# Patient Record
Sex: FEMALE | Race: WHITE | NOT HISPANIC OR LATINO | URBAN - METROPOLITAN AREA
[De-identification: names, ages, dates, MRNs, and addresses within clinical notes are randomized per-mention and may not be internally consistent; named-entity substitution may affect disease eponyms.]

---

## 2020-08-25 ENCOUNTER — EMERGENCY (EMERGENCY)
Facility: HOSPITAL | Age: 20
LOS: 1 days | Discharge: HOME | End: 2020-08-25
Payer: COMMERCIAL

## 2020-08-25 VITALS
HEART RATE: 96 BPM | DIASTOLIC BLOOD PRESSURE: 66 MMHG | TEMPERATURE: 97 F | RESPIRATION RATE: 17 BRPM | OXYGEN SATURATION: 98 % | SYSTOLIC BLOOD PRESSURE: 113 MMHG

## 2020-08-25 DIAGNOSIS — Z20.828 CONTACT WITH AND (SUSPECTED) EXPOSURE TO OTHER VIRAL COMMUNICABLE DISEASES: ICD-10-CM

## 2020-08-25 PROCEDURE — U0003: CPT

## 2020-08-25 PROCEDURE — 99283 EMERGENCY DEPT VISIT LOW MDM: CPT

## 2020-08-25 NOTE — ED STATDOCS - PATIENT PORTAL LINK FT
You can access the FollowMyHealth Patient Portal offered by [facility] by registering at the following website: [email or web address]. By joining [employer]’s FollowMyHealth portal, you will also be able to view your health information using other applications (apps) compatible with our system.

## 2020-08-25 NOTE — ED ADULT TRIAGE NOTE - AS TEMP SITE
Scheduled procedure with Patient at      Telephone Information:   Mobile [phone]      Scheduled Via: Case Request Order for  [facility]  Patient prefers NA facility rather than the doctor's preferred facility  Procedure date: 1.10.2020  Procedure time: 7:15AM  Insurance confirmed as [employer], will be the same at time of procedure?: Yes  Insurance Accepted at Facility? Yes    The following have been confirmed:  Latex Allergy No  Diabetic No  Sleep Apnea No  Diuretic/Water pill No  Defibrillator/Pacemaker No  MRSA hx No  Blood thinners: Coumadin (Warfarin) or Plavix No      Aspirin No      Phentermine (diet pill) No    Prep required? Yes, HIBICLENS Briefly reviewed? Yes  If procedure is scheduled 7 days or less, patient was told to  prep letter?: n/a  Letter sent via [facility]     oral

## 2020-08-26 LAB — SARS-COV-2 RNA SPEC QL NAA+PROBE: SIGNIFICANT CHANGE UP

## 2021-08-31 ENCOUNTER — TRANSCRIPTION ENCOUNTER (OUTPATIENT)
Age: 21
End: 2021-08-31

## 2021-09-01 ENCOUNTER — LABORATORY RESULT (OUTPATIENT)
Age: 21
End: 2021-09-01

## 2021-09-01 ENCOUNTER — APPOINTMENT (OUTPATIENT)
Dept: OBGYN | Facility: CLINIC | Age: 21
End: 2021-09-01
Payer: COMMERCIAL

## 2021-09-01 VITALS
DIASTOLIC BLOOD PRESSURE: 60 MMHG | SYSTOLIC BLOOD PRESSURE: 102 MMHG | BODY MASS INDEX: 22.2 KG/M2 | HEIGHT: 64 IN | WEIGHT: 130 LBS

## 2021-09-01 DIAGNOSIS — Z97.5 PRESENCE OF (INTRAUTERINE) CONTRACEPTIVE DEVICE: ICD-10-CM

## 2021-09-01 DIAGNOSIS — Z11.3 ENCOUNTER FOR SCREENING FOR INFECTIONS WITH A PREDOMINANTLY SEXUAL MODE OF TRANSMISSION: ICD-10-CM

## 2021-09-01 PROCEDURE — 36415 COLL VENOUS BLD VENIPUNCTURE: CPT

## 2021-09-01 PROCEDURE — 99385 PREV VISIT NEW AGE 18-39: CPT

## 2021-09-01 RX ORDER — DESVENLAFAXINE 50 MG/1
50 TABLET, EXTENDED RELEASE ORAL
Refills: 0 | Status: ACTIVE | COMMUNITY

## 2021-09-01 NOTE — PLAN
[FreeTextEntry1] : Health Maintenance: 21 year old female pt presents for establish gyn care\par BSE taught\par Reviewed diet and exercise\par Breast and pelvic exam performed\par Pap Gc/Chl conducted\par STI testing\par \par RTO in 1 year or PRN\par

## 2021-09-01 NOTE — HISTORY OF PRESENT ILLNESS
[FreeTextEntry1] : NANETTE FRANCO 21 year old female G0 LMP 08/05/2021, presents for [facility] gynecologic care, [facility] ParaGuard IUD\par No Hx of STD, PID, or IUD use for contraception. No Hx of abnormal pap smear. No ovarian cyst, uterine fibroid, endometriosis, pelvic infection or infertility.\par \par She feels well and offers no complaints - no more emotional symptoms since starting copper IUD. \par She reports monthly menses on ParaGuard IUD. She denies intermenstrual bleeding, abn vaginal discharge or vaginitis symptoms. No urinary complaints. BM is normal per patient, no bloody stool. She denies abdominal and pelvic pain.\par \par Pt is sexually active. 1 of partners. always condoms. Denies sexual dysfunction.\par \par \par depression - on prestiq, seen by psych and therapist weekly.

## 2021-09-01 NOTE — COUNSELING
[Nutrition/ Exercise/ Weight Management] : nutrition, exercise, weight management [Vitamins/Supplements] : vitamins/supplements [Breast Self Exam] : breast self exam [Contraception/ Emergency Contraception/ Safe Sexual Practices] : contraception, emergency contraception, safe sexual practices [Confidentiality] : confidentiality [STD (testing, results, tx)] : STD (testing, results, tx)

## 2021-09-02 LAB
C TRACH RRNA SPEC QL NAA+PROBE: NOT DETECTED
HBV SURFACE AG SER QL: NONREACTIVE
HCV AB SER QL: NONREACTIVE
HCV S/CO RATIO: 0.08 S/CO
HIV1+2 AB SPEC QL IA.RAPID: NONREACTIVE
N GONORRHOEA RRNA SPEC QL NAA+PROBE: NOT DETECTED
SOURCE TP AMPLIFICATION: NORMAL
T PALLIDUM AB SER QL IA: NEGATIVE

## 2021-09-10 LAB — CYTOLOGY CVX/VAG DOC THIN PREP: ABNORMAL

## 2022-02-01 ENCOUNTER — APPOINTMENT (OUTPATIENT)
Dept: OBGYN | Facility: CLINIC | Age: 22
End: 2022-02-01
Payer: SELF-PAY

## 2022-02-01 VITALS
BODY MASS INDEX: 22.2 KG/M2 | HEIGHT: 64 IN | WEIGHT: 130 LBS | DIASTOLIC BLOOD PRESSURE: 76 MMHG | SYSTOLIC BLOOD PRESSURE: 114 MMHG

## 2022-02-01 DIAGNOSIS — Z00.00 ENCOUNTER FOR GENERAL ADULT MEDICAL EXAMINATION W/OUT ABNORMAL FINDINGS: ICD-10-CM

## 2022-02-01 PROCEDURE — 99213 OFFICE O/P EST LOW 20 MIN: CPT

## 2022-02-01 NOTE — PLAN
[FreeTextEntry1] : #R labial cyst\par -no s/s of infection, likely sebaceous cyst. Pain while voiding at site of cyst likely due to a microabration overlying cyst, but unable to see it today.\par -recommend sitz baths 2-3 times daily\par -kendell bottle to be used while voiding to dilute urine\par \par #UTI?\par -pain only with urination\par -UA/Ucx\par \par f/u in 2-4 weeks to re-eval cyst. If cyst grows or develop s/s of infection, will plan to office I&D and biopsy.\par R Jennifer RANDOLPH

## 2022-02-01 NOTE — PHYSICAL EXAM
[Labia Majora] : normal [Labia Minora] : normal on the left [Uterine Adnexae] : normal [Appropriately responsive] : appropriately responsive [Alert] : alert [No Acute Distress] : no acute distress [Normal] : normal external genitalia [FreeTextEntry2] : 5mm sebaceous cyst on inner R labia. no surrounding erythema, edema, or drainage. No obvious breaks in the skin

## 2022-02-01 NOTE — HISTORY OF PRESENT ILLNESS
[FreeTextEntry1] : 20 yo G0 LMP 1/3/22, Paragard IUD in place presents with a possible R labial cyst vs. boil.\par \par Pt noted a new R inner labial cyst 3 nights ago. The cyst measures smaller than a pea without overlying erythema or edema. Pt attempted to pop/drain the cyst without success. After she manipulated the cyst, she c/o of terrible pain while voiding only at the site of the cyst. She describes it as a burning sensation.\par Denies N/V, abdominal pain, fever, chills \par \par Pt has not had sex in 2 months.

## 2022-02-02 LAB — BACTERIA UR CULT: NORMAL
